# Patient Record
Sex: FEMALE | Race: WHITE | NOT HISPANIC OR LATINO | Employment: OTHER | URBAN - METROPOLITAN AREA
[De-identification: names, ages, dates, MRNs, and addresses within clinical notes are randomized per-mention and may not be internally consistent; named-entity substitution may affect disease eponyms.]

---

## 2018-03-09 ENCOUNTER — ESTABLISHED COMPREHENSIVE EXAM (OUTPATIENT)
Dept: URBAN - METROPOLITAN AREA CLINIC 47 | Facility: CLINIC | Age: 43
End: 2018-03-09

## 2018-03-09 DIAGNOSIS — H52.13: ICD-10-CM

## 2018-03-09 DIAGNOSIS — H52.203: ICD-10-CM

## 2018-03-09 DIAGNOSIS — H52.4: ICD-10-CM

## 2018-03-09 PROCEDURE — 92310-2 LEVEL 2 CONTACT LENS MANAGEMENT

## 2018-03-09 PROCEDURE — 92310PEW PREMIUM SPECIALTY EXTENDED WEAR

## 2018-03-09 PROCEDURE — 92015 DETERMINE REFRACTIVE STATE: CPT

## 2018-03-09 PROCEDURE — 92014 COMPRE OPH EXAM EST PT 1/>: CPT

## 2018-03-09 ASSESSMENT — VISUAL ACUITY
OD_SC: 20/70-1
OS_SC: 20/70-1
OS_CC: J2
OD_CC: J3
OD_SC: J2
OS_SC: J2
OD_CC: 20/30-2
OS_CC: 20/30-1

## 2018-03-09 ASSESSMENT — TONOMETRY
OS_IOP_MMHG: 12
OD_IOP_MMHG: 13

## 2018-03-23 ENCOUNTER — CONTACT LENS FOLLOW UP (OUTPATIENT)
Dept: URBAN - METROPOLITAN AREA CLINIC 47 | Facility: CLINIC | Age: 43
End: 2018-03-23

## 2018-03-23 DIAGNOSIS — H52.13: ICD-10-CM

## 2018-03-23 PROCEDURE — 92310F

## 2018-03-23 ASSESSMENT — VISUAL ACUITY
OD_CC: 20/20-2
OU_CC: 20/30-1
OS_CC: 20/30-2

## 2018-09-20 ENCOUNTER — CONSULT (OUTPATIENT)
Dept: URBAN - METROPOLITAN AREA CLINIC 43 | Facility: CLINIC | Age: 43
End: 2018-09-20

## 2018-09-20 DIAGNOSIS — H02.832: ICD-10-CM

## 2018-09-20 DIAGNOSIS — H02.834: ICD-10-CM

## 2018-09-20 DIAGNOSIS — H02.835: ICD-10-CM

## 2018-09-20 DIAGNOSIS — H02.831: ICD-10-CM

## 2018-09-20 PROCEDURE — 92285 EXTERNAL OCULAR PHOTOGRAPHY: CPT

## 2018-09-20 PROCEDURE — 99213 OFFICE O/P EST LOW 20 MIN: CPT

## 2018-09-20 ASSESSMENT — VISUAL ACUITY
OS_PH: 20/40
OD_SC: 20/80
OD_PH: 20/40
OS_SC: 20/70

## 2020-08-04 ENCOUNTER — ESTABLISHED COMPREHENSIVE EXAM (OUTPATIENT)
Dept: URBAN - METROPOLITAN AREA CLINIC 43 | Facility: CLINIC | Age: 45
End: 2020-08-04

## 2020-08-04 DIAGNOSIS — H52.13: ICD-10-CM

## 2020-08-04 DIAGNOSIS — H52.4: ICD-10-CM

## 2020-08-04 DIAGNOSIS — H52.203: ICD-10-CM

## 2020-08-04 PROCEDURE — 92015 DETERMINE REFRACTIVE STATE: CPT

## 2020-08-04 PROCEDURE — 92014 COMPRE OPH EXAM EST PT 1/>: CPT

## 2020-08-04 PROCEDURE — 92310PDW PREMIUM SPECIALTY DAILY WEAR

## 2020-08-04 PROCEDURE — 92310-2 LEVEL 2 CONTACT LENS MANAGEMENT

## 2020-08-04 ASSESSMENT — TONOMETRY
OS_IOP_MMHG: 15
OD_IOP_MMHG: 13

## 2021-12-15 ENCOUNTER — COMPREHENSIVE EXAM (OUTPATIENT)
Dept: URBAN - METROPOLITAN AREA CLINIC 47 | Facility: CLINIC | Age: 46
End: 2021-12-15

## 2021-12-15 DIAGNOSIS — Z97.3: ICD-10-CM

## 2021-12-15 DIAGNOSIS — Z46.0: ICD-10-CM

## 2021-12-15 DIAGNOSIS — H04.123: ICD-10-CM

## 2021-12-15 DIAGNOSIS — H52.13: ICD-10-CM

## 2021-12-15 DIAGNOSIS — H52.4: ICD-10-CM

## 2021-12-15 PROCEDURE — 92310-1 LEVEL 1 CONTACT LENS MANAGEMENT

## 2021-12-15 PROCEDURE — 92015 DETERMINE REFRACTIVE STATE: CPT

## 2021-12-15 PROCEDURE — 92014 COMPRE OPH EXAM EST PT 1/>: CPT

## 2021-12-15 ASSESSMENT — TONOMETRY
OD_IOP_MMHG: 12
OS_IOP_MMHG: 12

## 2021-12-15 ASSESSMENT — VISUAL ACUITY
OS_SC: 20/70-1
OD_SC: J1
OS_SC: J1
OD_SC: 20/70

## 2022-06-17 ENCOUNTER — EMERGENCY VISIT (OUTPATIENT)
Dept: URBAN - METROPOLITAN AREA CLINIC 39 | Facility: CLINIC | Age: 47
End: 2022-06-17

## 2022-06-17 DIAGNOSIS — B00.51: ICD-10-CM

## 2022-06-17 DIAGNOSIS — H04.123: ICD-10-CM

## 2022-06-17 PROCEDURE — 92012 INTRM OPH EXAM EST PATIENT: CPT

## 2022-06-17 RX ORDER — LOTEPREDNOL ETABONATE 5 MG/ML
1 SUSPENSION/ DROPS OPHTHALMIC
Start: 2022-06-18

## 2022-06-17 RX ORDER — BUSPIRONE HYDROCHLORIDE 10 MG/1: 1 TABLET ORAL

## 2022-06-17 ASSESSMENT — VISUAL ACUITY
OS_SC: 20/70+2
OD_CC: 20/25+2

## 2022-06-17 ASSESSMENT — TONOMETRY: OS_IOP_MMHG: 8

## 2022-06-23 ENCOUNTER — FOLLOW UP (OUTPATIENT)
Dept: URBAN - METROPOLITAN AREA CLINIC 39 | Facility: CLINIC | Age: 47
End: 2022-06-23

## 2022-06-23 DIAGNOSIS — B00.51: ICD-10-CM

## 2022-06-23 DIAGNOSIS — H04.123: ICD-10-CM

## 2022-06-23 PROCEDURE — 92012 INTRM OPH EXAM EST PATIENT: CPT

## 2022-06-23 RX ORDER — MINERAL OIL, PETROLATUM 425; 573 MG/G; MG/G: OINTMENT OPHTHALMIC EVERY EVENING

## 2022-06-23 ASSESSMENT — TONOMETRY
OS_IOP_MMHG: 10
OD_IOP_MMHG: 10

## 2022-06-23 ASSESSMENT — VISUAL ACUITY
OS_CC: 20/30
OU_CC: 20/20-1
OD_CC: 20/30-2

## 2022-12-19 ENCOUNTER — PREPPED CHART (OUTPATIENT)
Dept: URBAN - METROPOLITAN AREA CLINIC 47 | Facility: CLINIC | Age: 47
End: 2022-12-19

## 2023-04-11 ENCOUNTER — APPOINTMENT (RX ONLY)
Dept: URBAN - METROPOLITAN AREA CLINIC 137 | Facility: CLINIC | Age: 48
Setting detail: DERMATOLOGY
End: 2023-04-11

## 2023-04-11 DIAGNOSIS — T07XXXA ABRASION OR FRICTION BURN OF OTHER, MULTIPLE, AND UNSPECIFIED SITES, WITHOUT MENTION OF INFECTION: ICD-10-CM

## 2023-04-11 PROBLEM — S00.411A ABRASION OF RIGHT EAR, INITIAL ENCOUNTER: Status: ACTIVE | Noted: 2023-04-11

## 2023-04-11 PROCEDURE — 99202 OFFICE O/P NEW SF 15 MIN: CPT

## 2023-04-11 PROCEDURE — ? ADDITIONAL NOTES

## 2023-04-11 PROCEDURE — ? COUNSELING

## 2023-04-11 ASSESSMENT — LOCATION DETAILED DESCRIPTION DERM: LOCATION DETAILED: RIGHT ANTERIOR EARLOBE

## 2023-04-11 ASSESSMENT — LOCATION ZONE DERM: LOCATION ZONE: EAR

## 2023-04-11 ASSESSMENT — LOCATION SIMPLE DESCRIPTION DERM: LOCATION SIMPLE: RIGHT EAR

## 2023-04-11 NOTE — PROCEDURE: ADDITIONAL NOTES
Additional Notes: No infection present. No deformity present. Discussed earlobe revision. Patient advised not to wear earrings for 6 to 8 weeks.
Render Risk Assessment In Note?: no
Detail Level: Zone

## 2023-04-11 NOTE — HPI: BODY LOCATION - EAR
How Severe Is Your Condition?: mild
Additional History: pt was playing with a dog, hair caught on earring causing tear.
Year Removed: [address]